# Patient Record
Sex: FEMALE | Race: WHITE
[De-identification: names, ages, dates, MRNs, and addresses within clinical notes are randomized per-mention and may not be internally consistent; named-entity substitution may affect disease eponyms.]

---

## 2017-05-24 ENCOUNTER — HOSPITAL ENCOUNTER (EMERGENCY)
Dept: HOSPITAL 25 - UCCORT | Age: 42
Discharge: HOME | End: 2017-05-24
Payer: COMMERCIAL

## 2017-05-24 VITALS — SYSTOLIC BLOOD PRESSURE: 163 MMHG | DIASTOLIC BLOOD PRESSURE: 60 MMHG

## 2017-05-24 DIAGNOSIS — Z88.2: ICD-10-CM

## 2017-05-24 DIAGNOSIS — E11.9: ICD-10-CM

## 2017-05-24 DIAGNOSIS — F17.210: ICD-10-CM

## 2017-05-24 DIAGNOSIS — M25.571: Primary | ICD-10-CM

## 2017-05-24 DIAGNOSIS — I10: ICD-10-CM

## 2017-05-24 DIAGNOSIS — Z79.84: ICD-10-CM

## 2017-05-24 PROCEDURE — G0463 HOSPITAL OUTPT CLINIC VISIT: HCPCS

## 2017-05-24 PROCEDURE — 99213 OFFICE O/P EST LOW 20 MIN: CPT

## 2017-05-24 NOTE — RAD
INDICATION:  Medial right ankle and lower leg pain x2 days without reported trauma



COMPARISON: None.



TECHNIQUE: 3 views of the right ankle and 2 views of the right lower leg were obtained.



FINDINGS: The bones are normal alignment. Joint spaces appear maintained. No fracture is

seen.



IMPRESSION:  NORMAL RIGHT LOWER LEG AND ANKLE RADIOGRAPH.



If the patient's symptoms persist, follow-up imaging is recommended.

## 2017-05-24 NOTE — UC
Lower Extremity/Ankle HPI





- HPI Summary


HPI Summary: 





RIGHT (MEDIAL MALEOLAR) ANKLE PAIN, RADIATES TO (MEDIAL) SIDE OF LEG WITH 

OCCASIONAL BURNING TIMGLING IN (ANTEROMEDIAL) KNEE (AT PATELLA). NO SIGNIFICANT 

TRAUMA. NO FEVER. NO DISCOLORIZATION





- History of Current Complaint


Chief Complaint: UCLowerExtremity


Stated Complaint: RIGHT ANKLE/LEG PAIN & NUMBNESS


Time Seen by Provider: 05/24/17 17:24


Hx Obtained From: Patient


Hx Last Menstrual Period: 4/25/17


Onset/Duration: Gradual Onset, Lasting Days, Still Present


Severity Initially: Mild


Severity Currently: Mild


Aggravating Factor(s): Standing, Ambulation, Other - TOUCH TO MEDIAL RIGHT 

MALEOLUS


Alleviating Factor(s): OTC Meds


Able to Bear Weight: Yes





- Risk Factors


Gout Risk Factors: Negative


DVT Risk Factors: Negative


Septic Arthritis Risk Factor: Negative





- Allergies/Home Medications


Allergies/Adverse Reactions: 


 Allergies











Allergy/AdvReac Type Severity Reaction Status Date / Time


 


Sulfa Antibiotics Allergy  Rash Verified 05/24/17 16:52











Home Medications: 


 Home Medications





Aspirin [Aspirin 81 MG TAB] 81 mg PO DAILY 05/24/17 [History Confirmed 05/24/17]


Glimepiride [Amaryl] 4 mg PO BID 05/24/17 [History Confirmed 05/24/17]











PMH/Surg Hx/FS Hx/Imm Hx


Previously Healthy: Yes


Endocrine History Of: Reports: Diabetes - Type II


Cardiovascular History Of: Reports: Hypertension





- Surgical History


Surgical History: Yes


Surgery Procedure, Year, and Place: L shoulder, L carpal tunnel.  right 

Achilles tendon 1993





- Family History


Known Family History: Positive: None


   Negative: Other - NO CONNECTIVE TISSUE OR JOINT LAXITY HISTORY





- Social History


Occupation: Employed Full-time


Lives: With Family


Alcohol Use: Rare


Substance Use Type: None


Smoking Status (MU): Heavy Every Day Tobacco Smoker


Type: Cigarettes


Amount Used/How Often: 1/2 ppd


Length of Time of Smoking/Using Tobacco: 24 yrs


Have You Smoked in the Last Year: No


When Did the Patient Quit Smoking/Using Tobacco: 01/01/15





- Immunization History


Most Recent Influenza Vaccination: has not had





Review of Systems


Constitutional: Negative


Skin: Negative


Eyes: Negative


ENT: Negative


Respiratory: Negative


Cardiovascular: Negative


Gastrointestinal: Negative


Genitourinary: Negative


Motor: Negative


Neurovascular: Negative


Musculoskeletal: Arthralgia, Myalgia


Neurological: Negative


Psychological: Negative


All Other Systems Reviewed And Are Negative: Yes





Physical Exam


Triage Information Reviewed: Yes


Appearance: Well-Appearing, No Pain Distress, Well-Nourished


Vital Signs: 


 Initial Vital Signs











Temp  98.6 F   05/24/17 16:47


 


Pulse  95   05/24/17 16:47


 


Resp  16   05/24/17 16:47


 


BP  163/60   05/24/17 16:47


 


Pulse Ox  100   05/24/17 16:47











Vital Signs Reviewed: Yes


Eye Exam: Normal


ENT Exam: Normal


Dental Exam: Normal


Neck exam: Normal


Neck: Positive: Supple, Nontender, No Lymphadenopathy


Respiratory Exam: Normal


Respiratory: Positive: Chest non-tender, Lungs clear, Normal breath sounds, No 

respiratory distress, No accessory muscle use


Cardiovascular Exam: Normal


Cardiovascular: Positive: RRR, No Murmur, Pulses Normal, Brisk Capillary Refill


Abdominal Exam: Normal


Musculoskeletal: Positive: Strength Intact, ROM Intact, No Edema, Other: - 

NEGATIVE HOMANS; TENDER TO PALPATION OF MEDIAL MALLEOLUS


Neurological Exam: Normal


Psychological Exam: Normal


Skin Exam: Normal





Lower Extremity Course/Dx





- Differential Dx/Diagnosis


Differential Diagnosis/HQI/PQRI: Sprain, Strain


Provider Diagnoses: RIGHT ANKLE ARTHRALGIA





Discharge





- Discharge Plan


Condition: Stable


Disposition: HOME


Patient Education Materials:  Ankle Sprain (ED)


Forms:  *Work Release


Referrals: 


Dayne Guevara MD [Medical Doctor] - 


Froylan Zheng DO [Primary Care Provider] -

## 2018-12-10 ENCOUNTER — HOSPITAL ENCOUNTER (OUTPATIENT)
Dept: HOSPITAL 25 - OR | Age: 43
Discharge: HOME | End: 2018-12-10
Attending: ORTHOPAEDIC SURGERY
Payer: COMMERCIAL

## 2018-12-10 VITALS — DIASTOLIC BLOOD PRESSURE: 73 MMHG | SYSTOLIC BLOOD PRESSURE: 117 MMHG

## 2018-12-10 DIAGNOSIS — Z79.84: ICD-10-CM

## 2018-12-10 DIAGNOSIS — I10: ICD-10-CM

## 2018-12-10 DIAGNOSIS — G56.01: Primary | ICD-10-CM

## 2018-12-10 DIAGNOSIS — M67.431: ICD-10-CM

## 2018-12-10 DIAGNOSIS — E11.9: ICD-10-CM

## 2018-12-10 PROCEDURE — 81025 URINE PREGNANCY TEST: CPT

## 2018-12-10 PROCEDURE — 88304 TISSUE EXAM BY PATHOLOGIST: CPT

## 2018-12-11 NOTE — OP
DATE OF OPERATION:  12/10/18 - [facility]

 

DATE OF BIRTH:  [birth year]

 

SURGEON:  Kane Bright MD

 

ASSISTANT:  CASE Mendez

 

ANESTHESIOLOGIST:  Dr. Weaver.

 

ANESTHESIA:  Local MAC.

 

PRE-OP DIAGNOSES:

1.  Right carpal tunnel syndrome.

2.  Right volar wrist ganglion cyst.

 

POST-OP DIAGNOSES:

1.  Right carpal tunnel syndrome.

2.  Right volar wrist ganglion cyst.

 

OPERATIVE PROCEDURE:

1.  Right carpal tunnel release.

2.  Right volar wrist ganglion cyst excision.

 

INDICATIONS:  Nadiya has a cyst that waxes and wanes in size.  It is associated 
with carpal tunnel type symptoms.  We had talked about risks and benefits, she 
had wanted to proceed with surgery.

 

ESTIMATED BLOOD LOSS:  2 mL.

 

COMPLICATIONS:  None.

 

FINDINGS:  See above and below.

 

DESCRIPTION OF PROCEDURE:  Nadiya was seen in the preoperative holding area.  
The correct site, side, and procedure were identified.  We came back to the 
operating room.  The arm was prepped and draped in the usual fashion and a time-
out was performed.

 

The arm was exsanguinated with an Esmarch and then tourniquet was inflated to 
250 mmHg.  I made a 2-cm incision in the proximal palm in the typical location 
for an open carpal tunnel release.  Dissection was carried down through the 
subcutaneous tissue and palmar fascia.  The transverse carpal ligament was 
released just off the radial aspect of the hook of the hamate.  The release was 
completed distally and then proximally, the subcutaneous tissue was released 
and retracted out of the way and then the remainder of the transverse carpal 
ligament and distal antebrachial fascia was released with a tenotomy scissors.  
The release was checked and everything was looking good.  So, we turned our 
attention to the cyst.

 

A V-shaped ulnarly based flap was raised off the volar wrist ganglion cyst just 
a little bit distal to the radiocarpal joint.  The flap was raised up off the 
cyst.  The margin of the cyst was bluntly released taking care to preserve any 
traversing sensory nerves.  I traced the cyst all the way back to where it came 
off the wrist joint, which was just near the volar carpus.  The cyst was 
amputated at the base of the wound.  The area was very gently cauterized with 
the median nerve gently retracted out of the way, so as to not to cauterize the 
median nerve.  This was all done very carefully and with very low power 
cautery.  Once I cauterized the base of the cyst, I irrigated out both wounds.  
The skin was closed with 4-0 nylon suture.  She was placed in a volar cock-up 
wrist splint.  Tourniquet was deflated and she was taken to the recovery room 
in stable condition.

 

 [id number]/[facility] #: [id number]

SELENA

## 2019-11-30 ENCOUNTER — HOSPITAL ENCOUNTER (EMERGENCY)
Dept: HOSPITAL 25 - UCCORT | Age: 44
Discharge: HOME | End: 2019-11-30
Payer: COMMERCIAL

## 2019-11-30 VITALS — DIASTOLIC BLOOD PRESSURE: 92 MMHG | SYSTOLIC BLOOD PRESSURE: 135 MMHG

## 2019-11-30 DIAGNOSIS — R42: ICD-10-CM

## 2019-11-30 DIAGNOSIS — Z88.2: ICD-10-CM

## 2019-11-30 DIAGNOSIS — F17.210: ICD-10-CM

## 2019-11-30 DIAGNOSIS — R05: ICD-10-CM

## 2019-11-30 DIAGNOSIS — M62.830: Primary | ICD-10-CM

## 2019-11-30 PROCEDURE — 72070 X-RAY EXAM THORAC SPINE 2VWS: CPT

## 2019-11-30 PROCEDURE — 99212 OFFICE O/P EST SF 10 MIN: CPT

## 2019-11-30 PROCEDURE — G0463 HOSPITAL OUTPT CLINIC VISIT: HCPCS

## 2019-11-30 NOTE — UC
Back Pain HPI





- HPI Summary


HPI Summary: 





Mid back pain for over a week. Pt used "Tella doc" and was prescribed prednisone

, flonase, tessalon pearls.She wa dx with bronchitis.  Pt finished meds. Pt 

states the back pain started with the cough. Pt is feeling better as far as the 

cough goes . Still has the back pain. When she looks down or bends over she 

becomes dizzy at times.  





- History of Current Complaint


Chief Complaint: UCBackPain


Stated Complaint: BACK PAIN


Time Seen by Provider: 11/30/19 20:37


Hx Obtained From: Patient


Hx Last Menstrual Period: uterine ablasion in 2017


Pregnant?: No


Onset/Duration: Sudden Onset, Lasting Days


Severity Initially: Moderate


Severity Currently: Severe


Pain Intensity: 8


Character: Dull, Burning


Aggravating Factor(s): Bending


Alleviating Factor(s): Position





- Allergies/Home Medications


Allergies/Adverse Reactions: 


 Allergies











Allergy/AdvReac Type Severity Reaction Status Date / Time


 


Sulfa (Sulfonamide Allergy  Rash Verified 11/30/19 20:33





Antibiotics)     











Home Medications: 


 Home Medications





Hydrochlorothiazide TAB* [Hydrodiuril TAB*] 12.5 mg PO DAILY 11/30/19 [History 

Confirmed 11/30/19]











PMH/Surg Hx/FS Hx/Imm Hx


Previously Healthy: Yes





- Surgical History


Surgical History: Yes


Surgery Procedure, Year, and Place: L shoulder, L carpal tunnel.  right 

Achilles tendon 1993.  D&C-2017.  03/2018-ENDOMETRIAL ABLATION.  right carpal 

malina





- Family History


Known Family History: Positive: None, Respiratory Disease


   Negative: Other - NO CONNECTIVE TISSUE OR JOINT LAXITY HISTORY





- Social History


Alcohol Use: Rare


Substance Use Type: None


Smoking Status (MU): Light Every Day Tobacco Smoker


Type: Cigarettes


Amount Used/How Often: 1/2 ppd OR LESS X 25 YEARS


Length of Time of Smoking/Using Tobacco: 24 yrs


Have You Smoked in the Last Year: Yes


When Did the Patient Quit Smoking/Using Tobacco: 01/01/15


Household Exposure Type: Cigarettes





- Immunization History


Most Recent Influenza Vaccination: has not had





Review of Systems


All Other Systems Reviewed And Are Negative: Yes


Respiratory: Positive: Cough


Musculoskeletal: Positive: Myalgia


Is Patient Immunocompromised?: No





Physical Exam


Triage Information Reviewed: Yes


Appearance: Well-Appearing, Well-Nourished, Pain Distress


Vital Signs: 


 Initial Vital Signs











Temp  98.2 F   11/30/19 20:35


 


Pulse  91   11/30/19 20:35


 


Resp  16   11/30/19 20:35


 


BP  135/92   11/30/19 20:35


 


Pulse Ox  99   11/30/19 20:35











Vital Signs Reviewed: Yes


Eye Exam: Normal


ENT Exam: Normal


Dental Exam: Normal


Neck exam: Normal


Respiratory: Positive: Chest non-tender, Lungs clear, Normal breath sounds, 

Other: - harsh cough


Abdominal Exam: Normal


Abdomen Description: Positive: Nontender, No Organomegaly, Soft


Bowel Sounds: Positive: Present


Musculoskeletal: Positive: Strength Intact, ROM Intact, No Edema, Other: - pain 

with flex and ext of the thoracic spine, and retraction of the scapula





Back Pain Course/Dx





- Course


Course Of Treatment: 





hx obtained, exam performed ,meds reviewed, xray obtained, radiologist read 

pending.


flexeril given for muscle spasm





- Differential Dx/Diagnosis


Differential Diagnosis/HQI/PQRI: Strain, Sprain


Provider Diagnosis: 


 Back muscle spasm








Discharge ED





- Sign-Out/Discharge


Documenting (check all that apply): Patient Departure


All imaging exams completed and their final reports reviewed: No Studies





- Discharge Plan


Condition: Stable


Disposition: HOME


Prescriptions: 


Cyclobenzaprine TAB* [Flexeril 10 MG TAB*] 10 mg PO TID PRN #15 tab


 PRN Reason: Spasms


Referrals: 


Eleanor Morris PA [Primary Care Provider] - 





- Billing Disposition and Condition


Condition: STABLE


Disposition: Home

## 2019-11-30 NOTE — UC
Course/Dx





- Diagnoses


Provider Diagnoses: 


 Back muscle spasm








Discharge ED





- Sign-Out/Discharge


Documenting (check all that apply): Patient Departure


All imaging exams completed and their final reports reviewed: No





- Discharge Plan


Condition: Stable


Disposition: HOME


Prescriptions: 


Cyclobenzaprine TAB* [Flexeril 10 MG TAB*] 10 mg PO TID PRN #15 tab


 PRN Reason: Spasms


Patient Education Materials:  Muscle Spasm (ED)


Referrals: 


Eleanor Morris PA [Primary Care Provider] - 


Dayne Guevara MD [Medical Doctor] - 


Additional Instructions: 


1. use the flexeril as needed.


2. Stretch the back with arms over head


3. follow up with Dr guevara if not improving in the next 7-10 days





- Billing Disposition and Condition


Condition: STABLE


Disposition: Home

## 2019-12-01 NOTE — UC
- Progress Note


Progress Note: 


Final radiologist reading for thoracic spine x-ray from November 30, 2019 comes 

back as no acute process.





Provider report of the same date does not mention fracture therefore there is 

no discrepancy.





Course/Dx





- Diagnoses


Provider Diagnoses: 


 Back muscle spasm








Discharge ED





- Sign-Out/Discharge


Documenting (check all that apply): Patient Departure


All imaging exams completed and their final reports reviewed: Yes





- Discharge Plan


Condition: Stable


Disposition: HOME


Prescriptions: 


Cyclobenzaprine TAB* [Flexeril 10 MG TAB*] 10 mg PO TID PRN #15 tab


 PRN Reason: Spasms


Patient Education Materials:  Muscle Spasm (ED)


Referrals: 


Eleanor Morris PA [Primary Care Provider] - 


Dayne Guevara MD [Medical Doctor] - 


Additional Instructions: 


1. use the flexeril as needed.


2. Stretch the back with arms over head


3. follow up with Dr guevara if not improving in the next 7-10 days





- Billing Disposition and Condition


Condition: STABLE


Disposition: Home